# Patient Record
Sex: FEMALE | Race: WHITE | Employment: FULL TIME | URBAN - METROPOLITAN AREA
[De-identification: names, ages, dates, MRNs, and addresses within clinical notes are randomized per-mention and may not be internally consistent; named-entity substitution may affect disease eponyms.]

---

## 2019-02-07 ENCOUNTER — CLINICAL SUPPORT (OUTPATIENT)
Dept: FAMILY MEDICINE CLINIC | Age: 34
End: 2019-02-07

## 2019-02-07 DIAGNOSIS — E66.9 OBESITY, UNSPECIFIED CLASSIFICATION, UNSPECIFIED OBESITY TYPE, UNSPECIFIED WHETHER SERIOUS COMORBIDITY PRESENT: Primary | ICD-10-CM

## 2019-02-08 DIAGNOSIS — E66.01 CLASS 3 SEVERE OBESITY WITH BODY MASS INDEX (BMI) OF 40.0 TO 44.9 IN ADULT, UNSPECIFIED OBESITY TYPE, UNSPECIFIED WHETHER SERIOUS COMORBIDITY PRESENT (HCC): Primary | ICD-10-CM

## 2019-02-08 NOTE — PROGRESS NOTES
Patient attended a Medically Supervised Weight Loss New Patient Orientation today where we discussed:  - New Direction Very Low Calorie Diet details  - Medical Supervision  - Nutrition education  - Cost of Meal Replacements  - Policies and compliance required for program enrollment.      Patients initial consultation with physician is tentatively scheduled for:  Future Appointments   Date Time Provider Syed Telles   2/19/2019  1:00 PM Sriram Brown NP [address] [facility]

## 2019-02-15 ENCOUNTER — HOSPITAL ENCOUNTER (OUTPATIENT)
Dept: LAB | Age: 34
Discharge: HOME | End: 2019-02-15
Payer: COMMERCIAL

## 2019-02-15 DIAGNOSIS — E66.01 CLASS 3 SEVERE OBESITY WITH BODY MASS INDEX (BMI) OF 40.0 TO 44.9 IN ADULT, UNSPECIFIED OBESITY TYPE, UNSPECIFIED WHETHER SERIOUS COMORBIDITY PRESENT (HCC): ICD-10-CM

## 2019-02-15 LAB
ALBUMIN SERPL-MCNC: 4 G/DL (ref 3.4–5)
ALBUMIN/GLOB SERPL: 1.2 {RATIO} (ref 0.8–1.7)
ALP SERPL-CCNC: 66 U/L (ref 45–117)
ALT SERPL-CCNC: 52 U/L (ref 13–56)
ANION GAP SERPL CALC-SCNC: 8 MMOL/L (ref 3–18)
APPEARANCE UR: CLEAR
AST SERPL-CCNC: 34 U/L (ref 15–37)
ATRIAL RATE: 80 BPM
BACTERIA URNS QL MICRO: ABNORMAL /HPF
BASOPHILS # BLD: 0 K/UL (ref 0–0.1)
BASOPHILS NFR BLD: 0 % (ref 0–2)
BILIRUB SERPL-MCNC: 0.4 MG/DL (ref 0.2–1)
BILIRUB UR QL: NEGATIVE
BUN SERPL-MCNC: 10 MG/DL (ref 7–18)
BUN/CREAT SERPL: 11 (ref 12–20)
CALCIUM SERPL-MCNC: 8.8 MG/DL (ref 8.5–10.1)
CALCULATED P AXIS, ECG09: 39 DEGREES
CALCULATED R AXIS, ECG10: 5 DEGREES
CALCULATED T AXIS, ECG11: 27 DEGREES
CHLORIDE SERPL-SCNC: 99 MMOL/L (ref 100–108)
CHOLEST SERPL-MCNC: 174 MG/DL
CO2 SERPL-SCNC: 30 MMOL/L (ref 21–32)
COLOR UR: YELLOW
CREAT SERPL-MCNC: 0.89 MG/DL (ref 0.6–1.3)
DIAGNOSIS, 93000: NORMAL
DIFFERENTIAL METHOD BLD: NORMAL
EOSINOPHIL # BLD: 0.2 K/UL (ref 0–0.4)
EOSINOPHIL NFR BLD: 2 % (ref 0–5)
EPITH CASTS URNS QL MICRO: ABNORMAL /LPF (ref 0–5)
ERYTHROCYTE [DISTWIDTH] IN BLOOD BY AUTOMATED COUNT: 13.5 % (ref 11.6–14.5)
GLOBULIN SER CALC-MCNC: 3.3 G/DL (ref 2–4)
GLUCOSE SERPL-MCNC: 75 MG/DL (ref 74–99)
GLUCOSE UR STRIP.AUTO-MCNC: NEGATIVE MG/DL
HCT VFR BLD AUTO: 40.1 % (ref 35–45)
HDLC SERPL-MCNC: 40 MG/DL (ref 40–60)
HDLC SERPL: 4.4 {RATIO} (ref 0–5)
HGB BLD-MCNC: 14 G/DL (ref 12–16)
HGB UR QL STRIP: NEGATIVE
KETONES UR QL STRIP.AUTO: NEGATIVE MG/DL
LDLC SERPL CALC-MCNC: 88.8 MG/DL (ref 0–100)
LEUKOCYTE ESTERASE UR QL STRIP.AUTO: ABNORMAL
LIPID PROFILE,FLP: ABNORMAL
LYMPHOCYTES # BLD: 3.3 K/UL (ref 0.9–3.6)
LYMPHOCYTES NFR BLD: 32 % (ref 21–52)
MAGNESIUM SERPL-MCNC: 2.2 MG/DL (ref 1.6–2.6)
MCH RBC QN AUTO: 29.7 PG (ref 24–34)
MCHC RBC AUTO-ENTMCNC: 34.9 G/DL (ref 31–37)
MCV RBC AUTO: 85 FL (ref 74–97)
MONOCYTES # BLD: 0.5 K/UL (ref 0.05–1.2)
MONOCYTES NFR BLD: 5 % (ref 3–10)
NEUTS SEG # BLD: 6.1 K/UL (ref 1.8–8)
NEUTS SEG NFR BLD: 61 % (ref 40–73)
NITRITE UR QL STRIP.AUTO: NEGATIVE
P-R INTERVAL, ECG05: 162 MS
PH UR STRIP: 6.5 [PH] (ref 5–8)
PLATELET # BLD AUTO: 369 K/UL (ref 135–420)
PMV BLD AUTO: 9.5 FL (ref 9.2–11.8)
POTASSIUM SERPL-SCNC: 3.7 MMOL/L (ref 3.5–5.5)
PROT SERPL-MCNC: 7.3 G/DL (ref 6.4–8.2)
PROT UR STRIP-MCNC: NEGATIVE MG/DL
Q-T INTERVAL, ECG07: 388 MS
QRS DURATION, ECG06: 98 MS
QTC CALCULATION (BEZET), ECG08: 447 MS
RBC # BLD AUTO: 4.72 M/UL (ref 4.2–5.3)
RBC #/AREA URNS HPF: ABNORMAL /HPF (ref 0–5)
SODIUM SERPL-SCNC: 137 MMOL/L (ref 136–145)
SP GR UR REFRACTOMETRY: 1.01 (ref 1–1.03)
TRIGL SERPL-MCNC: 226 MG/DL (ref ?–150)
TSH SERPL DL<=0.05 MIU/L-ACNC: 3.77 UIU/ML (ref 0.36–3.74)
URATE SERPL-MCNC: 6.4 MG/DL (ref 2.6–7.2)
UROBILINOGEN UR QL STRIP.AUTO: 0.2 EU/DL (ref 0.2–1)
VENTRICULAR RATE, ECG03: 80 BPM
VLDLC SERPL CALC-MCNC: 45.2 MG/DL
WBC # BLD AUTO: 10.1 K/UL (ref 4.6–13.2)
WBC URNS QL MICRO: ABNORMAL /HPF (ref 0–4)

## 2019-02-15 PROCEDURE — 93005 ELECTROCARDIOGRAM TRACING: CPT

## 2019-02-15 PROCEDURE — 36415 COLL VENOUS BLD VENIPUNCTURE: CPT

## 2019-02-15 PROCEDURE — 85025 COMPLETE CBC W/AUTO DIFF WBC: CPT

## 2019-02-15 PROCEDURE — 84443 ASSAY THYROID STIM HORMONE: CPT

## 2019-02-15 PROCEDURE — 81001 URINALYSIS AUTO W/SCOPE: CPT

## 2019-02-15 PROCEDURE — 83735 ASSAY OF MAGNESIUM: CPT

## 2019-02-15 PROCEDURE — 80053 COMPREHEN METABOLIC PANEL: CPT

## 2019-02-15 PROCEDURE — 80061 LIPID PANEL: CPT

## 2019-02-15 PROCEDURE — 84550 ASSAY OF BLOOD/URIC ACID: CPT

## 2019-02-19 ENCOUNTER — OFFICE VISIT (OUTPATIENT)
Dept: SURGERY | Age: 34
End: 2019-02-19

## 2019-02-19 VITALS
HEART RATE: 94 BPM | WEIGHT: 283.7 LBS | RESPIRATION RATE: 18 BRPM | SYSTOLIC BLOOD PRESSURE: 126 MMHG | OXYGEN SATURATION: 98 % | TEMPERATURE: 98.3 F | BODY MASS INDEX: 45.59 KG/M2 | HEIGHT: 66 IN | DIASTOLIC BLOOD PRESSURE: 80 MMHG

## 2019-02-19 DIAGNOSIS — R63.5 WEIGHT GAIN: ICD-10-CM

## 2019-02-19 DIAGNOSIS — E78.5 HYPERLIPIDEMIA, UNSPECIFIED HYPERLIPIDEMIA TYPE: ICD-10-CM

## 2019-02-19 DIAGNOSIS — F41.9 ANXIETY: ICD-10-CM

## 2019-02-19 DIAGNOSIS — Z71.3 WEIGHT LOSS COUNSELING, ENCOUNTER FOR: ICD-10-CM

## 2019-02-19 DIAGNOSIS — E66.01 OBESITY, MORBID (HCC): Primary | ICD-10-CM

## 2019-02-19 DIAGNOSIS — I10 HYPERTENSION, UNSPECIFIED TYPE: ICD-10-CM

## 2019-02-19 RX ORDER — ESCITALOPRAM OXALATE 20 MG/1
20 TABLET ORAL DAILY
COMMUNITY

## 2019-02-19 RX ORDER — ERGOCALCIFEROL 1.25 MG/1
50000 CAPSULE ORAL
COMMUNITY

## 2019-02-19 RX ORDER — HYDROCHLOROTHIAZIDE 25 MG/1
12.5 TABLET ORAL DAILY
COMMUNITY

## 2019-02-19 RX ORDER — FENOFIBRATE 145 MG/1
TABLET, COATED ORAL DAILY
COMMUNITY

## 2019-02-19 NOTE — PROGRESS NOTES
Initial Consultation for Weight Loss    Zander Yip is a 29 y.o. female who comes into the office today for initial consultation for the options for the treatment of obesity. The patient initially identified obesity at the age of 16-14 and at age 26 weighed 200lbs. She has tried a variety of unsupervised weight-loss attempts including self imposed and Weight Watchers, but has yet to meet with lasting success. Maximum weight lost on a diet is about 50 lbs, but that the weight loss always seems to return. Today, the patient is  Height: 5' 6\" (167.6 cm) tall, Weight: 128.7 kg (283 lb 11.2 oz) lbs for a Body mass index is 45.79 kg/m². It is due to the patient's obesity, which is further complicated by hypertension and hyperlipidemia  that the patient is now seeking out medically supervised VLCD. Ideal body weight: 59.3 kg (130 lb 11.7 oz)  Adjusted ideal body weight: 87.1 kg (191 lb 14.7 oz) (Based on [employer] Tables)    Goal wt: 200lbs   End goal: 160- 170lb     Wt Readings from Last 10 Encounters:   02/19/19 128.7 kg (283 lb 11.2 oz)   03/12/18 113 kg (249 lb 1.9 oz)   06/14/13 116.1 kg (256 lb)   06/11/13 112.5 kg (248 lb)   05/28/13 112.5 kg (248 lb)       Weight Metrics 2/19/2019 2/19/2019 3/12/2018 6/14/2013 6/11/2013 5/28/2013   Weight - 283 lb 11.2 oz 249 lb 1.9 oz 256 lb 248 lb 248 lb   Waist Measure Inches 50.75 - - - - -   Body Fat % 46.2 - - - - -   BMI - 45.79 kg/m2 40.21 kg/m2 41.34 kg/m2 40.05 kg/m2 40.05 kg/m2     History of binge eating: no     History of purging: no    Major lifestyle changes: no   Other commitments: no   Any potential unsupportive: no     Has Mame Paulson ever been told by a physician not to exercise: no    Does Mame Paulson know of any reason they shouldn't exercise: no  If yes, why? Does Mame Buttery have any food allergies or sensitivities: cashews and pistachios       MWL questionnaire reviewed. If female:  No LMP recorded.     Past Medical History:   Diagnosis Date    Headache     migraines    Hyperlipidemia     Hypertension        Past Surgical History:   Procedure Laterality Date    HX  SECTION      c section x 2 and d and c    HX CHOLECYSTECTOMY      HX HERNIA REPAIR      incision[facility]       Current Outpatient Medications   Medication Sig Dispense Refill    fenofibrate nanocrystallized (TRICOR) 145 mg tablet Take  by mouth daily.  hydroCHLOROthiazide (HYDRODIURIL) 25 mg tablet Take 25 mg by mouth daily.  escitalopram oxalate (LEXAPRO) 20 mg tablet Take 20 mg by mouth daily.  ergocalciferol (VITAMIN D2) 50,000 unit capsule Take 50,000 Units by mouth every Monday and Friday.  EPINEPHrine (ADRENACLICK) 2.65 HT/1.70 mL injection 0.15 mL by IntraMUSCular route once as needed for up to 2 doses. 0.3 mL 0    LEVOTHYROXINE SODIUM (SYNTHROID PO) Take 75 mcg by mouth.       predniSONE (STERAPRED DS) 10 mg dose pack Take as directed on package 21 Tab 0       No Known Allergies    Social History     Tobacco Use    Smoking status: Never Smoker    Smokeless tobacco: Never Used   Substance Use Topics    Alcohol use: Yes     Frequency: Monthly or less     Comment: occasional    Drug use: No       Family History   Problem Relation Age of Onset    Heart defect Sister         pfo       Family Status   Relation Name Status    Sister  (Not Specified)       Review of Systems:   Positive in BOLD  CONST: Fever, weight loss, fatigue or chills  GI: Nausea, vomiting, abdominal pain, change in bowel habits, hematochezia, melena, and GERD   INTEG: Dermatitis, abnormal moles  HEENT: Recent changes in vision, vertigo, epistaxis, dysphagia and hoarseness  CV: Chest pain, palpitations, HTN, edema and varicosities  RESP: Cough, shortness of breath, wheezing, hemoptysis, snoring and reactive airway disease  : Hematuria, dysuria, frequency, urgency, nocturia and stress urinary incontinence   MS: Weakness, joint pain and arthritis  ENDO: Diabetes, thyroid disease, polyuria, polydipsia, polyphagia, poor wound healing, heat intolerance, cold intolerance  LYMPH/HEME: Anemia, bruising and history of blood transfusions  NEURO: Dizziness, headache, fainting, seizures and stroke  PSYCH: Anxiety and depression      Physical Exam    Visit Vitals  /80   Pulse 94   Temp 98.3 °F (36.8 °C)   Resp 18   Ht 5' 6\" (1.676 m)   Wt 128.7 kg (283 lb 11.2 oz)   SpO2 98%   BMI 45.79 kg/m²           Physical Exam   Constitutional: She is oriented to person, place, and time and well-developed, well-nourished, and in no distress. HENT:   Head: Normocephalic. Cardiovascular: Normal rate and normal heart sounds. Pulmonary/Chest: Effort normal and breath sounds normal.   Abdominal: Soft. Bowel sounds are normal. She exhibits no distension. There is no tenderness. Musculoskeletal: Normal range of motion. Neurological: She is alert and oriented to person, place, and time. Skin: Skin is warm and dry. Psychiatric: Affect normal.   Nursing note and vitals reviewed. Lab results reviewed. For significant abnormal values and values requiring intervention, see assessment and plan. TSH rowan. Endo   Trigly likely to improve with nutritional changes   Denies urinary s/s     Assessment/Plan  Yves Ramon is a 29 y.o. female who is suffering from obesity with a BMI of 45.7 who would benefit from weight loss. Diet regimen   # of meal replacements prescribed: 4    If modified LCD-nutritional guidelines:    Monthly Goal   -15    Medical monitoring schedule:   Weekly BP/Weight checks   Monthly provider appointments              Monthly CMP, uric acid checks      I have reviewed/discussed the above normal BMI with the patient. I have recommended the following interventions: dietary management education, guidance, and counseling, encourage exercise, monitor weight and prescribed dietary intake . Aleshia Deutsch Ms. Medford Schaumann has a reminder for a \"due or due soon\" health maintenance.  I have asked that she contact her primary care provider for follow-up on this health maintenance.     Di Gonzalez, FNP-BC

## 2019-02-19 NOTE — PATIENT INSTRUCTIONS
Monthly goal: -15lbs      4 meal replacements daily, no other food. First one within about 1 hour of waking up to get metabolism started. Don't go more than 6 hours between meals. No more than 1 soup a day, this is too much salt. No more than 1 bar a day, this not enough protein. You are allotted 10 carbs extra a day. Recommendations       - Consume your 4 daily meal replacements equally spaced over the day. Dont go more than 6 hours between each meal. Breakfast is especially important!      - Get the support of family and friends. - Snack-proof your home. - Have strategies for social situations, meetings that run over or vacation.       - If you fall off the plan; just start right back. Reflect on those days into examples of what to change or avoid next time. Program Compliance      We do not expect perfection. However, we do ask for your persistence and your willingness to do the work of growing yourself. You will hit plateaus in your weight loss. You will run into situations that test your will power. You will encounter times when you feel frustrated. How your respond to your slip ups and, the adjustments you make to prevent future slip ups will determine you long-term success. If you find yourself temporarily slipping in the program we will gently nudge you forward and encourage you to do the work of identifying and move beyond your stuck areas. However, if you find yourself wavering in the program for a prolonged time (more than 3 months) we will ask that you take a break from the program. At that time you will have 3 options:       1. Consult with one of our weight loss specialists to explore additional weight loss options. 2. Work with a counselor to do some focused work in order to identify and break the patterns that are holding you back. 3. A combination of both these.       Once you, your counselor and/or your weight loss specialist feel that you have moved past those patterns and want to give the program another chance, we will gladly work with you to determine if you're ready to start back in the program.      When returning after a break, if it has been over 3 months you will required to repeat an orientation and, if greater than 6 months, you will be required to repeat an orientation, labs and an EKG. Homework for [employer]        Exercise:   - Daily starting slow, gradually increase your time by 10- 20 % per week     - To prevent injury, take a recovery week every 4 weeks (reduce your exercise time and intensity by 1/2 during this time)     - Your goal is to work up to 150 min a week; hard enough that you can't whistle or sing. It may take 6 months to work up to this. - Call the Health  at [facility] labs for exercise ideas ([phone])          Diet:              Common Side Effects     -Constipation  -Fatigue  -Hunger  -Low sodium  -Hair Loss        1. Constipation: It can be prevented by Drinking at least 8-10 glasses of water a day, fiber, and exercise. If you're prone to constipation:  - Take a Stool Softener daily. Example: Dulcolax or Miralax   - Eat Plenty of Fiber                        Get the New Direction products with fiber added                        Keep your fiber intake to at least 20 grams a day                        Use SUGAR-FREE products: Fiber One, Benefiber or Metamucil     If you get constipated:  1. Start with a Stool Softener (produces a bowel movement in 72 hr)              2.  If you still have constipation after 2 to 3 days, add a Stimulant Laxative to the Stool Softener (results usually in 6-12 hr):  Examples:  Exlax (1 small square) for up to 4 days, Dulcolax stimulant laxative, or Magnesium citrate pill 500 mg start once a day and increase to 3 times a day if needed. 3. If conditions or symptoms persist contact your provider.      2.  Fatigue, most people experience this:  More common during the first 2 weeks, associated with your body adjusting to the Ketogenic diet. If symptoms persist contact your provider. 3.  Hunger:  More common in the first few days often disappears after body adjusts to the Ketogenic diet. 4.  Low blood levels of sodium, less common:   If you develop a headache, feel fatigued, light-headed or dizziness try adding 1/2 cup of bouillon broth every day. If symptoms persist contact your provider. 5.  Hair loss, you may see more than a usual amount of hair in your brush or the shower drain:   This can happen with physical stress (surgery, trauma or calorie restriction) or psychological stress. Although is it very concerning, it is often temporary and will resolve within 3 months. Some people notice a benefit from taking a daily dose of Biotin 2,500 mcg and increasing their Fish Oil intake. If symptoms persist contact your provider. For further information on where carbs hide in our foods:  1. Our Registered Dietitians     2. Www. Atkins. com/tools     3. Read food labels     4. Books       - The Calorie Luba oJ       - The Christmas System Diet for a New You       - Cici Savage's NEW Carb and Calorie 4th Edition (my favorite)     5. Smart phone and Internet-based apps       - DoubleMap        - Carb Manager     Helpful Information on behavior change:   Change Anything by Michael iMllan, Kourtney Ramos, and Cathi Dupont     Mindless Eating by Deborra Lively     Perfectly Yourself by Flory Cruz     Me, Myself and I - 28 Days to Self-Love by Jyoti Chisholm       Long-term Healthy Weight / Body Fat  Different ways to determining your ideal weight:  1. Keep your waist to less than 1/2 of your height   2. Keep your % body fat to under 30% for female and under 20% if male  3.  Keep your BMI around 25      Fun Facts About Carbs     - The Typical American Diet = 450 grams a day     - The Reducing Phase of the VLCD = 40 grams a day     - Target for weight loss maintenance:                        - Eat no more than 30 grams per meal                        - Eat no more than 10 grams per snack (mid-morning and mid-afternoon snack)

## 2019-02-27 ENCOUNTER — CLINICAL SUPPORT (OUTPATIENT)
Dept: FAMILY MEDICINE CLINIC | Age: 34
End: 2019-02-27

## 2019-02-27 DIAGNOSIS — E66.9 OBESITY, UNSPECIFIED CLASSIFICATION, UNSPECIFIED OBESITY TYPE, UNSPECIFIED WHETHER SERIOUS COMORBIDITY PRESENT: Primary | ICD-10-CM

## 2019-02-28 VITALS
DIASTOLIC BLOOD PRESSURE: 88 MMHG | WEIGHT: 274.9 LBS | HEIGHT: 66 IN | SYSTOLIC BLOOD PRESSURE: 136 MMHG | HEART RATE: 96 BPM | BODY MASS INDEX: 44.18 KG/M2

## 2019-02-28 NOTE — PROGRESS NOTES
Progress Note: Weekly Medical Monitoring in the [facility] Weight Loss Program    Is there anything that you or the patient needs to let the supervising provider know about? no    Over the past week, have you experienced any side-effects? no    Jenifer Masterson is a 29 y.o. female who is enrolled in [facility] Weight Loss Program    Jenifer Masterson was prescribed the VLCD / LCD. Visit Vitals  /88   Pulse 96   Ht 5' 6\" (1.676 m)   Wt 124.7 kg (274 lb 14.4 oz)   BMI 44.37 kg/m²     Weight Metrics 2/28/2019 2/27/2019 2/19/2019 2/19/2019 3/12/2018 6/14/2013 6/11/2013   Weight - 274 lb 14.4 oz - 283 lb 11.2 oz 249 lb 1.9 oz 256 lb 248 lb   Waist Measure Inches 50 - 50.75 - - - -   Body Fat % - - 46.2 - - - -   BMI - 44.37 kg/m2 - 45.79 kg/m2 40.21 kg/m2 41.34 kg/m2 40.05 kg/m2         Have you received any other medical care this week? no  If yes, where and for what? Have you had any change in your medications since your last visit? no  If yes what? Did you have any problems adhering to the program last week? no  If yes, please explain:       Eating Habits Over Last Week:  Did you take in 64 oz of non-caloric fluids? yes     Did you consume your prescribed meal replacement regimen each day?  yes       Physical Activity Over the Past Week:    Aerobic exercise:210 min  Resistance exercise: 0 workouts / week

## 2019-03-08 ENCOUNTER — CLINICAL SUPPORT (OUTPATIENT)
Dept: FAMILY MEDICINE CLINIC | Age: 34
End: 2019-03-08

## 2019-03-08 DIAGNOSIS — E66.9 OBESITY, UNSPECIFIED CLASSIFICATION, UNSPECIFIED OBESITY TYPE, UNSPECIFIED WHETHER SERIOUS COMORBIDITY PRESENT: Primary | ICD-10-CM

## 2019-03-11 VITALS
DIASTOLIC BLOOD PRESSURE: 84 MMHG | WEIGHT: 265.8 LBS | SYSTOLIC BLOOD PRESSURE: 124 MMHG | BODY MASS INDEX: 42.9 KG/M2 | HEART RATE: 82 BPM

## 2019-03-11 NOTE — PROGRESS NOTES
Progress Note: Weekly Medical Monitoring in the [facility] Weight Loss Program    Is there anything that you or the patient needs to let the supervising provider know about? no    Over the past week, have you experienced any side-effects? no    Nadja Couch is a 29 y.o. female who is enrolled in [facility] Weight Loss Program    Nadja Couch was prescribed the VLCD / LCD. Visit Vitals  /84   Pulse 82   Wt 120.6 kg (265 lb 12.8 oz)   BMI 42.90 kg/m²     Weight Metrics 3/11/2019 3/8/2019 2/28/2019 2/27/2019 2/19/2019 2/19/2019 3/12/2018   Weight - 265 lb 12.8 oz - 274 lb 14.4 oz - 283 lb 11.2 oz 249 lb 1.9 oz   Waist Measure Inches 49.5 - 50 - 50.75 - -   Body Fat % - - - - 46.2 - -   BMI - 42.9 kg/m2 - 44.37 kg/m2 - 45.79 kg/m2 40.21 kg/m2         Have you received any other medical care this week? no  If yes, where and for what? Have you had any change in your medications since your last visit? no  If yes what? Did you have any problems adhering to the program last week? no  If yes, please explain:       Eating Habits Over Last Week:  Did you take in 64 oz of non-caloric fluids? yes     Did you consume your prescribed meal replacement regimen each day?  yes       Physical Activity Over the Past Week:    Aerobic exercise: 210 min  Resistance exercise: 0 workouts / week

## 2019-03-15 ENCOUNTER — CLINICAL SUPPORT (OUTPATIENT)
Dept: FAMILY MEDICINE CLINIC | Age: 34
End: 2019-03-15

## 2019-03-15 VITALS
SYSTOLIC BLOOD PRESSURE: 112 MMHG | WEIGHT: 263.1 LBS | HEART RATE: 92 BPM | HEIGHT: 66 IN | BODY MASS INDEX: 42.28 KG/M2 | DIASTOLIC BLOOD PRESSURE: 78 MMHG

## 2019-03-15 DIAGNOSIS — E66.9 OBESITY, UNSPECIFIED CLASSIFICATION, UNSPECIFIED OBESITY TYPE, UNSPECIFIED WHETHER SERIOUS COMORBIDITY PRESENT: Primary | ICD-10-CM

## 2019-03-15 NOTE — PROGRESS NOTES
Chief Complaint   Patient presents with    Weight Management     Progress Note: Weekly Medical Monitoring in the [facility] Weight Loss Program    Is there anything that you or the patient needs to let the supervising provider know about? no    Over the past week, have you experienced any side-effects? no    Urban Harvey is a 29 y.o. female who is enrolled in [facility] Weight Loss Program    Urban Harvey was prescribed the VLCD / LCD. Visit Vitals  /78   Pulse 92   Ht 5' 6\" (1.676 m)   Wt 263 lb 1.6 oz (119.3 kg)   BMI 42.47 kg/m²     Weight Metrics 3/15/2019 3/11/2019 3/8/2019 2/28/2019 2/27/2019 2/19/2019 2/19/2019   Weight 263 lb 1.6 oz - 265 lb 12.8 oz - 274 lb 14.4 oz - 283 lb 11.2 oz   Waist Measure Inches 47 49.5 - 50 - 50.75 -   Body Fat % - - - - - 46.2 -   BMI 42.47 kg/m2 - 42.9 kg/m2 - 44.37 kg/m2 - 45.79 kg/m2         Have you received any other medical care this week? no  If yes, where and for what? Have you had any change in your medications since your last visit? no  If yes what? Did you have any problems adhering to the program last week? no  If yes, please explain:       Eating Habits Over Last Week:  Did you take in 64 oz of non-caloric fluids? yes     Did you consume your prescribed meal replacement regimen each day?  yes       Physical Activity Over the Past Week:    Aerobic exercise: 210 min  Resistance exercise: no workouts / week

## 2019-03-19 ENCOUNTER — OFFICE VISIT (OUTPATIENT)
Dept: SURGERY | Age: 34
End: 2019-03-19

## 2019-03-19 ENCOUNTER — HOSPITAL ENCOUNTER (OUTPATIENT)
Dept: LAB | Age: 34
Discharge: HOME | End: 2019-03-19
Payer: COMMERCIAL

## 2019-03-19 VITALS
HEART RATE: 79 BPM | OXYGEN SATURATION: 98 % | SYSTOLIC BLOOD PRESSURE: 105 MMHG | HEIGHT: 66 IN | WEIGHT: 262 LBS | TEMPERATURE: 98.1 F | BODY MASS INDEX: 42.11 KG/M2 | DIASTOLIC BLOOD PRESSURE: 71 MMHG | RESPIRATION RATE: 16 BRPM

## 2019-03-19 DIAGNOSIS — E66.01 OBESITY, MORBID (HCC): ICD-10-CM

## 2019-03-19 DIAGNOSIS — R63.4 RAPID WEIGHT LOSS: ICD-10-CM

## 2019-03-19 DIAGNOSIS — Z71.3 WEIGHT LOSS COUNSELING, ENCOUNTER FOR: ICD-10-CM

## 2019-03-19 DIAGNOSIS — E66.01 OBESITY, MORBID (HCC): Primary | ICD-10-CM

## 2019-03-19 DIAGNOSIS — I10 HYPERTENSION, UNSPECIFIED TYPE: ICD-10-CM

## 2019-03-19 LAB
ALBUMIN SERPL-MCNC: 4.2 G/DL (ref 3.4–5)
ALBUMIN/GLOB SERPL: 1.4 {RATIO} (ref 0.8–1.7)
ALP SERPL-CCNC: 56 U/L (ref 45–117)
ALT SERPL-CCNC: 57 U/L (ref 13–56)
ANION GAP SERPL CALC-SCNC: 7 MMOL/L (ref 3–18)
AST SERPL-CCNC: 32 U/L (ref 15–37)
BILIRUB SERPL-MCNC: 0.4 MG/DL (ref 0.2–1)
BUN SERPL-MCNC: 18 MG/DL (ref 7–18)
BUN/CREAT SERPL: 18 (ref 12–20)
CALCIUM SERPL-MCNC: 9.3 MG/DL (ref 8.5–10.1)
CHLORIDE SERPL-SCNC: 102 MMOL/L (ref 100–108)
CO2 SERPL-SCNC: 29 MMOL/L (ref 21–32)
CREAT SERPL-MCNC: 1.01 MG/DL (ref 0.6–1.3)
GLOBULIN SER CALC-MCNC: 3 G/DL (ref 2–4)
GLUCOSE SERPL-MCNC: 97 MG/DL (ref 74–99)
POTASSIUM SERPL-SCNC: 3.9 MMOL/L (ref 3.5–5.5)
PROT SERPL-MCNC: 7.2 G/DL (ref 6.4–8.2)
SODIUM SERPL-SCNC: 138 MMOL/L (ref 136–145)
URATE SERPL-MCNC: 5.6 MG/DL (ref 2.6–7.2)

## 2019-03-19 PROCEDURE — 80053 COMPREHEN METABOLIC PANEL: CPT

## 2019-03-19 PROCEDURE — 36415 COLL VENOUS BLD VENIPUNCTURE: CPT

## 2019-03-19 PROCEDURE — 84550 ASSAY OF BLOOD/URIC ACID: CPT

## 2019-03-19 NOTE — PROGRESS NOTES
Chief Complaint   Patient presents with    Weight Management     follow up     Nursing Note for Medical Monitoring in the [facility] Weight Loss Program      Aga Huang is a 29 y.o. female who is enrolled in [facility]    Aga Huang was prescribed the VLCD / LCD. Did you have any problems adhering to the program last week? yes  If yes, please explain: \"forgot to take food with me when I went out\"    Since your last visit, have you experienced any complications? no    Have you received any other medical care this week? no  If yes, where and for what? Have you had any change in your medications since your last visit? no  If yes what? Are you taking an appetite suppressant? no   If yes:  Do you need a refill? BP Readings from Last 3 Encounters:   03/19/19 105/71   03/15/19 112/78   03/11/19 124/84        Eating Habits Over Last Week:  Did you take in 64 oz of non-caloric fluids? yes     Did you consume your prescribed meal replacement regimen each day? yes       Physical Activity Over the Past Week:    Aerobic exercise: 270 min  Resistance exercise: no workouts / week    Body mass index is 42.29 kg/m².

## 2019-03-19 NOTE — PROGRESS NOTES
[facility] Weight Loss Program Progress Note:   F/up Provider Visit    CC: Weight Management    Nuris Velazquez is a 29 y.o. female who is here for her  f/up medical provider visit for the VLCD Program. she did attend class last week. Doing well, no c/o.   -21 lbs      Weight History  Weight Metrics 3/19/2019 3/19/2019 3/15/2019 3/11/2019 3/8/2019 2019 2019   Weight - 262 lb 263 lb 1.6 oz - 265 lb 12.8 oz - 274 lb 14.4 oz   Waist Measure Inches 47.5 - 47 49.5 - 50 -   Body Fat % - - - - - - -   BMI - 42.29 kg/m2 42.47 kg/m2 - 42.9 kg/m2 - 44.37 kg/m2        Goal wt: 200lbs   End goal: 160- 170lbs   Starting wt: 283lbs    EKG due: 233 lbs     Ideal body weight: 59.3 kg (130 lb 11.7 oz)  Adjusted ideal body weight: 83.1 kg (183 lb 3.8 oz)  Body mass index is 42.29 kg/m². History    Past Medical History:   Diagnosis Date    Headache     migraines    Hyperlipidemia     Hypertension        Past Surgical History:   Procedure Laterality Date    HX  SECTION      c section x 2 and d and c    HX CHOLECYSTECTOMY      HX HERNIA REPAIR      incisional from [facility]       Current Outpatient Medications   Medication Sig Dispense Refill    fenofibrate nanocrystallized (TRICOR) 145 mg tablet Take  by mouth daily.  hydroCHLOROthiazide (HYDRODIURIL) 25 mg tablet Take 25 mg by mouth daily.  escitalopram oxalate (LEXAPRO) 20 mg tablet Take 20 mg by mouth daily.  ergocalciferol (VITAMIN D2) 50,000 unit capsule Take 50,000 Units by mouth every Monday and Friday.  EPINEPHrine (ADRENACLICK) 1.88 TS/3.97 mL injection 0.15 mL by IntraMUSCular route once as needed for up to 2 doses. 0.3 mL 0    LEVOTHYROXINE SODIUM (SYNTHROID PO) Take 75 mcg by mouth.          Allergies   Allergen Reactions    Cashew Nut Anaphylaxis    Nut - Unspecified Anaphylaxis     pistachios       Social History     Tobacco Use    Smoking status: Never Smoker    Smokeless tobacco: Never Used   Substance Use Topics  Alcohol use: Yes     Frequency: Monthly or less     Comment: occasional    Drug use: No       Family History   Problem Relation Age of Onset    Heart defect Sister         pfo    Hypertension Mother     Cancer Mother     Cancer Father        Family Status   Relation Name Status    Sister  (Not Specified)    Mother  Alive    Father  Alive         Medications:  Outpatient Medications Marked as Taking for the 3/19/19 encounter (Office Visit) with Joaquina Maloney NP   Medication Sig Dispense Refill    fenofibrate nanocrystallized (TRICOR) 145 mg tablet Take  by mouth daily.  hydroCHLOROthiazide (HYDRODIURIL) 25 mg tablet Take 25 mg by mouth daily.  escitalopram oxalate (LEXAPRO) 20 mg tablet Take 20 mg by mouth daily.  ergocalciferol (VITAMIN D2) 50,000 unit capsule Take 50,000 Units by mouth every Monday and Friday.  EPINEPHrine (ADRENACLICK) 0.61 TI/3.96 mL injection 0.15 mL by IntraMUSCular route once as needed for up to 2 doses. 0.3 mL 0    LEVOTHYROXINE SODIUM (SYNTHROID PO) Take 75 mcg by mouth. Review of Systems  Review of Systems   All other systems reviewed and are negative. Objective  Visit Vitals  /71 (BP 1 Location: Right arm, BP Patient Position: Sitting)   Pulse 79   Temp 98.1 °F (36.7 °C) (Oral)   Resp 16   Ht 5' 6\" (1.676 m)   Wt 118.8 kg (262 lb)   SpO2 98%   BMI 42.29 kg/m²     No LMP recorded. Physical Exam  Physical Exam   Constitutional: She is oriented to person, place, and time. She appears well-developed and well-nourished. HENT:   Head: Normocephalic. Cardiovascular: Normal rate and regular rhythm. Pulmonary/Chest: Effort normal and breath sounds normal.   Abdominal: Soft. Bowel sounds are normal.   Musculoskeletal: Normal range of motion. Neurological: She is alert and oriented to person, place, and time. Skin: Skin is warm and dry. Psychiatric: She has a normal mood and affect.    Nursing note and vitals reviewed. Assessment / Plan    1. Weight management    Degree of control: great!!    Progress was reviewed with patient. Goal(s) for next appointment:   -10 to 15lbs       2.   Labs    Latest results reviewed with patient   Routine monitoring labs ordered  Orders Placed This Encounter    METABOLIC PANEL, COMPREHENSIVE     Standing Status:   Future     Standing Expiration Date:   3/19/2020    URIC ACID     Standing Status:   Future     Standing Expiration Date:   3/19/2020        >50% of 30 min visit spent counseling     JOHAN Baer-BC

## 2019-03-29 ENCOUNTER — CLINICAL SUPPORT (OUTPATIENT)
Dept: FAMILY MEDICINE CLINIC | Age: 34
End: 2019-03-29

## 2019-03-29 VITALS
HEIGHT: 66 IN | HEART RATE: 84 BPM | SYSTOLIC BLOOD PRESSURE: 106 MMHG | BODY MASS INDEX: 41.14 KG/M2 | DIASTOLIC BLOOD PRESSURE: 72 MMHG | WEIGHT: 256 LBS

## 2019-03-29 DIAGNOSIS — E66.9 OBESITY, UNSPECIFIED CLASSIFICATION, UNSPECIFIED OBESITY TYPE, UNSPECIFIED WHETHER SERIOUS COMORBIDITY PRESENT: Primary | ICD-10-CM

## 2019-03-29 NOTE — PROGRESS NOTES
Progress Note: Weekly Medical Monitoring in the [facility] Weight Loss Program    Is there anything that you or the patient needs to let the supervising provider know about? no    Over the past week, have you experienced any side-effects? no    Mena Gilbert is a 29 y.o. female who is enrolled in [facility] Weight Loss Program    Mena Gilbert was prescribed the VLCD / LCD. Visit Vitals  /72   Pulse 84   Ht 5' 6\" (1.676 m)   Wt 116.1 kg (256 lb)   BMI 41.32 kg/m²     Weight Metrics 3/29/2019 3/19/2019 3/19/2019 3/15/2019 3/11/2019 3/8/2019 2/28/2019   Weight 256 lb - 262 lb 263 lb 1.6 oz - 265 lb 12.8 oz -   Waist Measure Inches 46.5 47.5 - 47 49.5 - 50   Body Fat % - - - - - - -   BMI 41.32 kg/m2 - 42.29 kg/m2 42.47 kg/m2 - 42.9 kg/m2 -         Have you received any other medical care this week? yes  If yes, where and for what? pcp  Have you had any change in your medications since your last visit? yes  If yes what? hctz dose cut in half    Did you have any problems adhering to the program last week? no  If yes, please explain:       Eating Habits Over Last Week:  Did you take in 64 oz of non-caloric fluids? yes     Did you consume your prescribed meal replacement regimen each day?  yes       Physical Activity Over the Past Week:    Aerobic exercise: 0 min  Resistance exercise: 0 workouts / week

## 2019-04-05 ENCOUNTER — CLINICAL SUPPORT (OUTPATIENT)
Dept: FAMILY MEDICINE CLINIC | Age: 34
End: 2019-04-05

## 2019-04-05 VITALS
WEIGHT: 253 LBS | DIASTOLIC BLOOD PRESSURE: 78 MMHG | HEIGHT: 66 IN | SYSTOLIC BLOOD PRESSURE: 118 MMHG | BODY MASS INDEX: 40.66 KG/M2 | HEART RATE: 80 BPM

## 2019-04-05 DIAGNOSIS — E66.9 OBESITY, UNSPECIFIED CLASSIFICATION, UNSPECIFIED OBESITY TYPE, UNSPECIFIED WHETHER SERIOUS COMORBIDITY PRESENT: Primary | ICD-10-CM

## 2019-04-05 NOTE — PROGRESS NOTES
Chief Complaint   Patient presents with    Weight Management     Progress Note: Weekly Medical Monitoring in the [facility] Program    Is there anything that you or the patient needs to let the supervising provider know about? no    Over the past week, have you experienced any side-effects? no    Konrad Arauz is a 29 y.o. female who is enrolled in [facility]    Konrad Arauz was prescribed the VLCD / LCD. Visit Vitals  /78   Pulse 80   Ht 5' 6\" (1.676 m)   Wt 253 lb (114.8 kg)   BMI 40.84 kg/m²     Weight Metrics 4/5/2019 3/29/2019 3/19/2019 3/19/2019 3/15/2019 3/11/2019 3/8/2019   Weight 253 lb 256 lb - 262 lb 263 lb 1.6 oz - 265 lb 12.8 oz   Waist Measure Inches - 46.5 47.5 - 47 49.5 -   Body Fat % - - - - - - -   BMI 40.84 kg/m2 41.32 kg/m2 - 42.29 kg/m2 42.47 kg/m2 - 42.9 kg/m2         Have you received any other medical care this week? no  If yes, where and for what? Have you had any change in your medications since your last visit? no  If yes what? Did you have any problems adhering to the program last week? no  If yes, please explain:       Eating Habits Over Last Week:  Did you take in 64 oz of non-caloric fluids? yes     Did you consume your prescribed meal replacement regimen each day?  yes       Physical Activity Over the Past Week:    Aerobic exercise: 210 min  Resistance exercise: no workouts / week

## 2019-04-16 ENCOUNTER — HOSPITAL ENCOUNTER (OUTPATIENT)
Dept: LAB | Age: 34
Discharge: HOME | End: 2019-04-16
Payer: COMMERCIAL

## 2019-04-16 DIAGNOSIS — Z71.3 WEIGHT LOSS COUNSELING, ENCOUNTER FOR: ICD-10-CM

## 2019-04-16 DIAGNOSIS — E78.5 HYPERLIPIDEMIA, UNSPECIFIED HYPERLIPIDEMIA TYPE: ICD-10-CM

## 2019-04-16 DIAGNOSIS — R63.5 WEIGHT GAIN: ICD-10-CM

## 2019-04-16 DIAGNOSIS — E66.01 OBESITY, MORBID (HCC): ICD-10-CM

## 2019-04-16 DIAGNOSIS — I10 HYPERTENSION, UNSPECIFIED TYPE: ICD-10-CM

## 2019-04-16 DIAGNOSIS — F41.9 ANXIETY: ICD-10-CM

## 2019-04-16 LAB
ALBUMIN SERPL-MCNC: 4.1 G/DL (ref 3.4–5)
ALBUMIN/GLOB SERPL: 1.5 {RATIO} (ref 0.8–1.7)
ALP SERPL-CCNC: 49 U/L (ref 45–117)
ALT SERPL-CCNC: 45 U/L (ref 13–56)
ANION GAP SERPL CALC-SCNC: 4 MMOL/L (ref 3–18)
AST SERPL-CCNC: 21 U/L (ref 15–37)
BILIRUB SERPL-MCNC: 0.4 MG/DL (ref 0.2–1)
BUN SERPL-MCNC: 18 MG/DL (ref 7–18)
BUN/CREAT SERPL: 19 (ref 12–20)
CALCIUM SERPL-MCNC: 9.4 MG/DL (ref 8.5–10.1)
CHLORIDE SERPL-SCNC: 103 MMOL/L (ref 100–108)
CO2 SERPL-SCNC: 31 MMOL/L (ref 21–32)
CREAT SERPL-MCNC: 0.96 MG/DL (ref 0.6–1.3)
GLOBULIN SER CALC-MCNC: 2.8 G/DL (ref 2–4)
GLUCOSE SERPL-MCNC: 100 MG/DL (ref 74–99)
POTASSIUM SERPL-SCNC: 3.8 MMOL/L (ref 3.5–5.5)
PROT SERPL-MCNC: 6.9 G/DL (ref 6.4–8.2)
SODIUM SERPL-SCNC: 138 MMOL/L (ref 136–145)
URATE SERPL-MCNC: 4.4 MG/DL (ref 2.6–7.2)

## 2019-04-16 PROCEDURE — 84550 ASSAY OF BLOOD/URIC ACID: CPT

## 2019-04-16 PROCEDURE — 36415 COLL VENOUS BLD VENIPUNCTURE: CPT

## 2019-04-16 PROCEDURE — 80053 COMPREHEN METABOLIC PANEL: CPT

## 2019-04-17 ENCOUNTER — OFFICE VISIT (OUTPATIENT)
Dept: SURGERY | Age: 34
End: 2019-04-17

## 2019-04-17 VITALS
HEART RATE: 74 BPM | BODY MASS INDEX: 40.07 KG/M2 | WEIGHT: 249.3 LBS | TEMPERATURE: 98.2 F | HEIGHT: 66 IN | RESPIRATION RATE: 18 BRPM | SYSTOLIC BLOOD PRESSURE: 118 MMHG | DIASTOLIC BLOOD PRESSURE: 80 MMHG

## 2019-04-17 DIAGNOSIS — Z71.3 WEIGHT LOSS COUNSELING, ENCOUNTER FOR: ICD-10-CM

## 2019-04-17 DIAGNOSIS — R63.4 RAPID WEIGHT LOSS: ICD-10-CM

## 2019-04-17 DIAGNOSIS — E66.01 OBESITY, MORBID (HCC): Primary | ICD-10-CM

## 2019-04-17 DIAGNOSIS — I10 HYPERTENSION, UNSPECIFIED TYPE: ICD-10-CM

## 2019-04-17 NOTE — PROGRESS NOTES
[facility] Progress Note:   F/up Provider Visit    CC: Weight Management    Juan Caputo is a 29 y.o. female who is here for her  f/up medical provider visit for the VLCD Program. she did attend class last week. Doing well, no c/o.   -13 lbs this month   - 34 lbs overall     Notes inc in period freq to twice monthly, has IUD. Weight History  Weight Metrics 2019 2019 2019 3/29/2019 3/19/2019 3/19/2019 3/15/2019   Weight - 249 lb 4.8 oz 253 lb 256 lb - 262 lb 263 lb 1.6 oz   Waist Measure Inches 45.25 - - 46.5 47.5 - 47   Body Fat % - - - - - - -   BMI - 40.24 kg/m2 40.84 kg/m2 41.32 kg/m2 - 42.29 kg/m2 42.47 kg/m2        Goal wt: 200lbs   End goal: 160- 170lbs   Starting wt: 283lbs    EKG due: 233 lbs     Ideal body weight: 59.3 kg (130 lb 11.7 oz)  Adjusted ideal body weight: 80.8 kg (178 lb 2.6 oz)  Body mass index is 40.24 kg/m². History    Past Medical History:   Diagnosis Date    Headache     migraines    Hyperlipidemia     Hypertension        Past Surgical History:   Procedure Laterality Date    HX  SECTION      c section x 2 and d and c    HX CHOLECYSTECTOMY      HX HERNIA REPAIR      incisional from [facility]       Current Outpatient Medications   Medication Sig Dispense Refill    fenofibrate nanocrystallized (TRICOR) 145 mg tablet Take  by mouth daily.  hydroCHLOROthiazide (HYDRODIURIL) 25 mg tablet Take 12.5 mg by mouth daily.  escitalopram oxalate (LEXAPRO) 20 mg tablet Take 20 mg by mouth daily.  ergocalciferol (VITAMIN D2) 50,000 unit capsule Take 50,000 Units by mouth every Monday and Friday.  EPINEPHrine (ADRENACLICK) 9.04 OS/4.91 mL injection 0.15 mL by IntraMUSCular route once as needed for up to 2 doses. 0.3 mL 0    LEVOTHYROXINE SODIUM (SYNTHROID PO) Take 88 mcg by mouth.          Allergies   Allergen Reactions    Cashew Nut Anaphylaxis    Nut - Unspecified Anaphylaxis     pistachios       Social History Tobacco Use    Smoking status: Never Smoker    Smokeless tobacco: Never Used   Substance Use Topics    Alcohol use: Yes     Frequency: Monthly or less     Comment: occasional    Drug use: No       Family History   Problem Relation Age of Onset    Heart defect Sister         pfo    Hypertension Mother     Cancer Mother     Cancer Father        Family Status   Relation Name Status    Sister  (Not Specified)    Mother  Alive    Father  Alive       Medications:  Outpatient Medications Marked as Taking for the 4/17/19 encounter (Office Visit) with Jovan Taylor NP   Medication Sig Dispense Refill    fenofibrate nanocrystallized (TRICOR) 145 mg tablet Take  by mouth daily.  hydroCHLOROthiazide (HYDRODIURIL) 25 mg tablet Take 12.5 mg by mouth daily.  escitalopram oxalate (LEXAPRO) 20 mg tablet Take 20 mg by mouth daily.  ergocalciferol (VITAMIN D2) 50,000 unit capsule Take 50,000 Units by mouth every Monday and Friday.  EPINEPHrine (ADRENACLICK) 7.18 FU/4.21 mL injection 0.15 mL by IntraMUSCular route once as needed for up to 2 doses. 0.3 mL 0    LEVOTHYROXINE SODIUM (SYNTHROID PO) Take 88 mcg by mouth. Review of Systems  Review of Systems   All other systems reviewed and are negative. Objective  Visit Vitals  /80   Pulse 74   Temp 98.2 °F (36.8 °C)   Resp 18   Ht 5' 6\" (1.676 m)   Wt 113.1 kg (249 lb 4.8 oz)   BMI 40.24 kg/m²     No LMP recorded. Physical Exam  Physical Exam   Constitutional: She is oriented to person, place, and time. She appears well-developed and well-nourished. HENT:   Head: Normocephalic. Cardiovascular: Normal rate and regular rhythm. Pulmonary/Chest: Effort normal and breath sounds normal.   Abdominal: Soft. Bowel sounds are normal.   Musculoskeletal: Normal range of motion. Neurological: She is alert and oriented to person, place, and time. Skin: Skin is warm and dry. Psychiatric: She has a normal mood and affect. Nursing note and vitals reviewed. Assessment / Plan    1. Weight management    Degree of control: great!!    Progress was reviewed with patient. Goal(s) for next appointment:   -10 to 15lbs       2.   Labs    Latest results reviewed with patient   Routine monitoring labs ordered       >50% of 20 min visit spent counseling     JOHAN Carrasquillo-BC

## 2019-04-17 NOTE — PROGRESS NOTES
Chief Complaint   Patient presents with    Weight Management     Nursing Note for Medical Monitoring in the [facility]      Lydia Lorenzo is a 29 y.o. female who is enrolled in [facility]    Lydia Lorenzo was prescribed the VLCD / LCD. Did you have any problems adhering to the program last week? no  If yes, please explain:     Since your last visit, have you experienced any complications? no    Have you received any other medical care this week? no  If yes, where and for what? Have you had any change in your medications since your last visit? yes  If yes what? hctz reduced and levothyroxine increased    Are you taking an appetite suppressant? no   If yes:  Do you need a refill? BP Readings from Last 3 Encounters:   04/17/19 118/80   04/05/19 118/78   03/29/19 106/72        Eating Habits Over Last Week:  Did you take in 64 oz of non-caloric fluids? yes     Did you consume your prescribed meal replacement regimen each day?  yes       Physical Activity Over the Past Week:    Aerobic exercise: 210 min  Resistance exercise: 0 workouts / week

## 2019-05-03 ENCOUNTER — CLINICAL SUPPORT (OUTPATIENT)
Dept: FAMILY MEDICINE CLINIC | Age: 34
End: 2019-05-03

## 2019-05-03 VITALS
WEIGHT: 251.8 LBS | BODY MASS INDEX: 40.47 KG/M2 | HEART RATE: 88 BPM | SYSTOLIC BLOOD PRESSURE: 106 MMHG | HEIGHT: 66 IN | DIASTOLIC BLOOD PRESSURE: 74 MMHG

## 2019-05-03 DIAGNOSIS — E66.9 OBESITY, UNSPECIFIED CLASSIFICATION, UNSPECIFIED OBESITY TYPE, UNSPECIFIED WHETHER SERIOUS COMORBIDITY PRESENT: Primary | ICD-10-CM

## 2019-05-03 NOTE — PROGRESS NOTES
Progress Note: Weekly Medical Monitoring in the [facility]    Is there anything that you or the patient needs to let the supervising provider know about? no    Over the past week, have you experienced any side-effects? no    Zully Seaman is a 29 y.o. female who is enrolled in [facility]    Zully Seaman was prescribed the VLCD / LCD. Visit Vitals  /74   Pulse 88   Ht 5' 6\" (1.676 m)   Wt 114.2 kg (251 lb 12.8 oz)   BMI 40.64 kg/m²     Weight Metrics 5/3/2019 4/17/2019 4/17/2019 4/5/2019 3/29/2019 3/19/2019 3/19/2019   Weight 251 lb 12.8 oz - 249 lb 4.8 oz 253 lb 256 lb - 262 lb   Waist Measure Inches 46.25 45.25 - - 46.5 47.5 -   Body Fat % - - - - - - -   BMI 40.64 kg/m2 - 40.24 kg/m2 40.84 kg/m2 41.32 kg/m2 - 42.29 kg/m2         Have you received any other medical care this week? no  If yes, where and for what? Have you had any change in your medications since your last visit? no  If yes what? Did you have any problems adhering to the program last week? yes  If yes, please explain: ate several meals outside of plan      Eating Habits Over Last Week:  Did you take in 64 oz of non-caloric fluids?  yes     Did you consume your prescribed meal replacement regimen each day? no       Physical Activity Over the Past Week:    Aerobic exercise: 0 min  Resistance exercise: 0 workouts / week

## 2019-05-10 ENCOUNTER — CLINICAL SUPPORT (OUTPATIENT)
Dept: FAMILY MEDICINE CLINIC | Age: 34
End: 2019-05-10

## 2019-05-10 VITALS
HEART RATE: 82 BPM | BODY MASS INDEX: 39.6 KG/M2 | DIASTOLIC BLOOD PRESSURE: 80 MMHG | SYSTOLIC BLOOD PRESSURE: 114 MMHG | HEIGHT: 66 IN | WEIGHT: 246.4 LBS

## 2019-05-10 DIAGNOSIS — E66.9 OBESITY, UNSPECIFIED CLASSIFICATION, UNSPECIFIED OBESITY TYPE, UNSPECIFIED WHETHER SERIOUS COMORBIDITY PRESENT: Primary | ICD-10-CM

## 2019-05-10 NOTE — PROGRESS NOTES
Progress Note: Weekly Medical Monitoring in the [facility]    Is there anything that you or the patient needs to let the supervising provider know about? no    Over the past week, have you experienced any side-effects? no    Kaci Cherry is a 29 y.o. female who is enrolled in [facility]    Kaci Cherry was prescribed the VLCD / LCD. Visit Vitals  /80   Pulse 82   Ht 5' 6\" (1.676 m)   Wt 111.8 kg (246 lb 6.4 oz)   BMI 39.77 kg/m²     Weight Metrics 5/10/2019 5/3/2019 4/17/2019 4/17/2019 4/5/2019 3/29/2019 3/19/2019   Weight 246 lb 6.4 oz 251 lb 12.8 oz - 249 lb 4.8 oz 253 lb 256 lb -   Waist Measure Inches 46.25 46.25 45.25 - - 46.5 47.5   Body Fat % - - - - - - -   BMI 39.77 kg/m2 40.64 kg/m2 - 40.24 kg/m2 40.84 kg/m2 41.32 kg/m2 -         Have you received any other medical care this week? no  If yes, where and for what? Have you had any change in your medications since your last visit? no  If yes what? Did you have any problems adhering to the program last week? no  If yes, please explain:       Eating Habits Over Last Week:  Did you take in 64 oz of non-caloric fluids? yes     Did you consume your prescribed meal replacement regimen each day?  yes       Physical Activity Over the Past Week:    Aerobic exercise: 210 min  Resistance exercise: 0 workouts / week

## 2019-05-14 ENCOUNTER — HOSPITAL ENCOUNTER (OUTPATIENT)
Dept: LAB | Age: 34
Discharge: HOME | End: 2019-05-14
Payer: COMMERCIAL

## 2019-05-14 DIAGNOSIS — Z71.3 WEIGHT LOSS COUNSELING, ENCOUNTER FOR: ICD-10-CM

## 2019-05-14 DIAGNOSIS — R63.4 RAPID WEIGHT LOSS: ICD-10-CM

## 2019-05-14 DIAGNOSIS — E66.01 OBESITY, MORBID (HCC): ICD-10-CM

## 2019-05-14 DIAGNOSIS — I10 HYPERTENSION, UNSPECIFIED TYPE: ICD-10-CM

## 2019-05-14 LAB
ALBUMIN SERPL-MCNC: 3.9 G/DL (ref 3.4–5)
ALBUMIN/GLOB SERPL: 1.3 {RATIO} (ref 0.8–1.7)
ALP SERPL-CCNC: 55 U/L (ref 45–117)
ALT SERPL-CCNC: 33 U/L (ref 13–56)
ANION GAP SERPL CALC-SCNC: 5 MMOL/L (ref 3–18)
AST SERPL-CCNC: 21 U/L (ref 15–37)
BILIRUB SERPL-MCNC: 0.3 MG/DL (ref 0.2–1)
BUN SERPL-MCNC: 13 MG/DL (ref 7–18)
BUN/CREAT SERPL: 15 (ref 12–20)
CALCIUM SERPL-MCNC: 9.1 MG/DL (ref 8.5–10.1)
CHLORIDE SERPL-SCNC: 106 MMOL/L (ref 100–108)
CO2 SERPL-SCNC: 28 MMOL/L (ref 21–32)
CREAT SERPL-MCNC: 0.88 MG/DL (ref 0.6–1.3)
GLOBULIN SER CALC-MCNC: 2.9 G/DL (ref 2–4)
GLUCOSE SERPL-MCNC: 92 MG/DL (ref 74–99)
POTASSIUM SERPL-SCNC: 4.3 MMOL/L (ref 3.5–5.5)
PROT SERPL-MCNC: 6.8 G/DL (ref 6.4–8.2)
SODIUM SERPL-SCNC: 139 MMOL/L (ref 136–145)
URATE SERPL-MCNC: 5 MG/DL (ref 2.6–7.2)

## 2019-05-14 PROCEDURE — 80053 COMPREHEN METABOLIC PANEL: CPT

## 2019-05-14 PROCEDURE — 36415 COLL VENOUS BLD VENIPUNCTURE: CPT

## 2019-05-14 PROCEDURE — 84550 ASSAY OF BLOOD/URIC ACID: CPT

## 2019-05-15 ENCOUNTER — OFFICE VISIT (OUTPATIENT)
Dept: SURGERY | Age: 34
End: 2019-05-15

## 2019-05-15 VITALS
DIASTOLIC BLOOD PRESSURE: 74 MMHG | TEMPERATURE: 98.2 F | WEIGHT: 249 LBS | BODY MASS INDEX: 40.02 KG/M2 | SYSTOLIC BLOOD PRESSURE: 108 MMHG | HEART RATE: 80 BPM | HEIGHT: 66 IN | RESPIRATION RATE: 18 BRPM

## 2019-05-15 DIAGNOSIS — E66.01 OBESITY, MORBID (HCC): Primary | ICD-10-CM

## 2019-05-15 DIAGNOSIS — Z71.3 WEIGHT LOSS COUNSELING, ENCOUNTER FOR: ICD-10-CM

## 2019-05-15 NOTE — PROGRESS NOTES
[facility] Progress Note:   F/up Provider Visit    CC: Weight Management    Андрей Radford is a 29 y.o. female who is here for her  f/up medical provider visit for the VLCD Program. she did attend class last week. Struggled with emotional eating this month. No change in weight. More active with kids and feeling good physically. Weight History  Weight Metrics 5/15/2019 5/15/2019 5/10/2019 5/3/2019 2019 2019 2019   Weight - 249 lb 246 lb 6.4 oz 251 lb 12.8 oz - 249 lb 4.8 oz 253 lb   Waist Measure Inches 45 - 46.25 46.25 45.25 - -   Body Fat % - - - - - - -   BMI - 40.19 kg/m2 39.77 kg/m2 40.64 kg/m2 - 40.24 kg/m2 40.84 kg/m2        Goal wt: 200lbs   End goal: 160- 170lbs   Starting wt: 283lbs    EKG due: 233 lbs     Ideal body weight: 59.3 kg (130 lb 11.7 oz)  Adjusted ideal body weight: 80.8 kg (178 lb 0.6 oz)  Body mass index is 40.19 kg/m². History    Past Medical History:   Diagnosis Date    Headache     migraines    Hyperlipidemia     Hypertension        Past Surgical History:   Procedure Laterality Date    HX  SECTION      c section x 2 and d and c    HX CHOLECYSTECTOMY      HX HERNIA REPAIR      incisional from [facility]       Current Outpatient Medications   Medication Sig Dispense Refill    fenofibrate nanocrystallized (TRICOR) 145 mg tablet Take  by mouth daily.  hydroCHLOROthiazide (HYDRODIURIL) 25 mg tablet Take 12.5 mg by mouth daily.  escitalopram oxalate (LEXAPRO) 20 mg tablet Take 20 mg by mouth daily.  ergocalciferol (VITAMIN D2) 50,000 unit capsule Take 50,000 Units by mouth every Monday and Friday.  LEVOTHYROXINE SODIUM (SYNTHROID PO) Take 88 mcg by mouth.  EPINEPHrine (ADRENACLICK) 1.34 KR/0.70 mL injection 0.15 mL by IntraMUSCular route once as needed for up to 2 doses.  0.3 mL 0       Allergies   Allergen Reactions    Cashew Nut Anaphylaxis    Nut - Unspecified Anaphylaxis     pistachios       Social History     Tobacco Use    Smoking status: Never Smoker    Smokeless tobacco: Never Used   Substance Use Topics    Alcohol use: Not Currently     Frequency: Monthly or less     Comment: occasional    Drug use: No       Family History   Problem Relation Age of Onset    Heart defect Sister         pfo    Hypertension Mother     Cancer Mother     Cancer Father        Family Status   Relation Name Status    Sister  (Not Specified)    Mother  Alive    Father  Alive       Medications:  Outpatient Medications Marked as Taking for the 5/15/19 encounter (Office Visit) with Gissel Crandall NP   Medication Sig Dispense Refill    fenofibrate nanocrystallized (TRICOR) 145 mg tablet Take  by mouth daily.  hydroCHLOROthiazide (HYDRODIURIL) 25 mg tablet Take 12.5 mg by mouth daily.  escitalopram oxalate (LEXAPRO) 20 mg tablet Take 20 mg by mouth daily.  ergocalciferol (VITAMIN D2) 50,000 unit capsule Take 50,000 Units by mouth every Monday and Friday.  LEVOTHYROXINE SODIUM (SYNTHROID PO) Take 88 mcg by mouth. Review of Systems  Review of Systems   Psychiatric/Behavioral:        Stress with son's medical condition    All other systems reviewed and are negative. Objective  Visit Vitals  /74   Pulse 80   Temp 98.2 °F (36.8 °C)   Resp 18   Ht 5' 6\" (1.676 m)   Wt 112.9 kg (249 lb)   BMI 40.19 kg/m²     No LMP recorded. Physical Exam  Physical Exam   Constitutional: She is oriented to person, place, and time. She appears well-developed and well-nourished. HENT:   Head: Normocephalic. Cardiovascular: Normal rate and regular rhythm. Pulmonary/Chest: Effort normal and breath sounds normal.   Abdominal: Soft. Bowel sounds are normal.   Musculoskeletal: Normal range of motion. Neurological: She is alert and oriented to person, place, and time. Skin: Skin is warm and dry. Psychiatric: She has a normal mood and affect.    Nursing note and vitals reviewed. Assessment / Plan    1. Weight management    Degree of control: stable, continue with VLCD will inc compliance this month    Progress was reviewed with patient. Goal(s) for next appointment:   -10 to 15lbs       2.   Labs    Latest results reviewed with patient   Routine monitoring labs ordered       >50% of 20 min visit spent counseling     JOHAN Sotelo-BC

## 2019-05-15 NOTE — PATIENT INSTRUCTIONS
Recent Results (from the past 336 hour(s))   METABOLIC PANEL, COMPREHENSIVE    Collection Time: 05/14/19 10:02 AM   Result Value Ref Range    Sodium 139 136 - 145 mmol/L    Potassium 4.3 3.5 - 5.5 mmol/L    Chloride 106 100 - 108 mmol/L    CO2 28 21 - 32 mmol/L    Anion gap 5 3.0 - 18 mmol/L    Glucose 92 74 - 99 mg/dL    BUN 13 7.0 - 18 MG/DL    Creatinine 0.88 0.6 - 1.3 MG/DL    BUN/Creatinine ratio 15 12 - 20      GFR est AA >60 >60 ml/min/1.73m2    GFR est non-AA >60 >60 ml/min/1.73m2    Calcium 9.1 8.5 - 10.1 MG/DL    Bilirubin, total 0.3 0.2 - 1.0 MG/DL    ALT (SGPT) 33 13 - 56 U/L    AST (SGOT) 21 15 - 37 U/L    Alk.  phosphatase 55 45 - 117 U/L    Protein, total 6.8 6.4 - 8.2 g/dL    Albumin 3.9 3.4 - 5.0 g/dL    Globulin 2.9 2.0 - 4.0 g/dL    A-G Ratio 1.3 0.8 - 1.7     URIC ACID    Collection Time: 05/14/19 10:02 AM   Result Value Ref Range    Uric acid 5.0 2.6 - 7.2 MG/DL

## 2019-05-15 NOTE — PROGRESS NOTES
Chief Complaint   Patient presents with    Weight Management   1. Have you been to the ER, urgent care clinic since your last visit? Hospitalized since your last visit? No    2. Have you seen or consulted any other health care providers outside of the [address] [place] [address] since your last visit? Include any pap smears or colon screening. No            Nursing Note for Medical Monitoring in the [facility]      Viola Graham is a 29 y.o. female who is enrolled in [facility]    Viola Graham was prescribed the VLCD / LCD. Did you have any problems adhering to the program last week? yes  If yes, please explain: emotional eating    Since your last visit, have you experienced any complications? no    Have you received any other medical care this week? no  If yes, where and for what? Have you had any change in your medications since your last visit? no  If yes what? Are you taking an appetite suppressant? no   If yes:  Do you need a refill? BP Readings from Last 3 Encounters:   05/15/19 108/74   05/10/19 114/80   05/03/19 106/74        Eating Habits Over Last Week:  Did you take in 64 oz of non-caloric fluids?  yes     Did you consume your prescribed meal replacement regimen each day? no       Physical Activity Over the Past Week:    Aerobic exercise: 210 min  Resistance exercise: 0 workouts / week

## 2019-05-31 ENCOUNTER — CLINICAL SUPPORT (OUTPATIENT)
Dept: FAMILY MEDICINE CLINIC | Age: 34
End: 2019-05-31

## 2019-05-31 VITALS
WEIGHT: 253.2 LBS | DIASTOLIC BLOOD PRESSURE: 77 MMHG | HEART RATE: 74 BPM | SYSTOLIC BLOOD PRESSURE: 113 MMHG | HEIGHT: 66 IN | BODY MASS INDEX: 40.69 KG/M2

## 2019-05-31 DIAGNOSIS — E66.9 OBESITY, UNSPECIFIED CLASSIFICATION, UNSPECIFIED OBESITY TYPE, UNSPECIFIED WHETHER SERIOUS COMORBIDITY PRESENT: Primary | ICD-10-CM

## 2019-05-31 NOTE — PROGRESS NOTES
Chief Complaint   Patient presents with    Weight Management     Patient attended a weekly class as part of the [facility].  This class was facilitated by a Registered Dietitian. Topics taught at class focused on diet, particularly carbohydrate counting and portion control. Classes also focused on behavior changes and the importance of establishing a daily exercise routine. Progress Note: Weekly Medical Monitoring in the [facility]    Is there anything that you or the patient needs to let the supervising provider know about? no    Over the past week, have you experienced any side-effects? no    Mario Alberto Cm is a 29 y.o. female who is enrolled in [facility]    Mario Alberto Cm was prescribed the VLCD / LCD. Visit Vitals  /77   Pulse 74   Ht 5' 6\" (1.676 m)   Wt 253 lb 3.2 oz (114.9 kg)   BMI 40.87 kg/m²     Weight Metrics 5/31/2019 5/15/2019 5/15/2019 5/10/2019 5/3/2019 4/17/2019 4/17/2019   Weight 253 lb 3.2 oz - 249 lb 246 lb 6.4 oz 251 lb 12.8 oz - 249 lb 4.8 oz   Waist Measure Inches 45 45 - 46.25 46.25 45.25 -   Body Fat % - - - - - - -   BMI 40.87 kg/m2 - 40.19 kg/m2 39.77 kg/m2 40.64 kg/m2 - 40.24 kg/m2         Have you received any other medical care this week? no  If yes, where and for what? Have you had any change in your medications since your last visit? no  If yes what? Did you have any problems adhering to the program last week? yes  If yes, please explain: \"traveled for work and fell back into old eating habits\"      Eating Habits Over Last Week:  Did you take in 64 oz of non-caloric fluids?  no     Did you consume your prescribed meal replacement regimen each day? no       Physical Activity Over the Past Week:    Aerobic exercise: 0  min  Resistance exercise: no workouts / week

## 2019-06-14 ENCOUNTER — CLINICAL SUPPORT (OUTPATIENT)
Dept: FAMILY MEDICINE CLINIC | Age: 34
End: 2019-06-14

## 2019-06-14 VITALS
BODY MASS INDEX: 40.42 KG/M2 | HEART RATE: 74 BPM | SYSTOLIC BLOOD PRESSURE: 114 MMHG | WEIGHT: 251.5 LBS | HEIGHT: 66 IN | DIASTOLIC BLOOD PRESSURE: 80 MMHG

## 2019-06-14 DIAGNOSIS — E66.9 OBESITY, UNSPECIFIED CLASSIFICATION, UNSPECIFIED OBESITY TYPE, UNSPECIFIED WHETHER SERIOUS COMORBIDITY PRESENT: Primary | ICD-10-CM

## 2019-06-14 NOTE — PROGRESS NOTES
Patient attended a weekly class as part of the [facility] Supervised Weight Loss Program.  This class was facilitated by a Registered Dietitian. Topics taught at class focused on diet, particularly carbohydrate counting and portion control. Classes also focused on behavior changes and the importance of establishing a daily exercise routine. Progress Note: Weekly Medical Monitoring in the [facility]    Is there anything that you or the patient needs to let the supervising provider know about? no    Over the past week, have you experienced any side-effects? no    Zeb Mendieta is a 29 y.o. female who is enrolled in [facility]    Zeb Mendieta was prescribed the VLCD / LCD. Visit Vitals  /80   Pulse 74   Ht 5' 6\" (1.676 m)   Wt 114.1 kg (251 lb 8 oz)   BMI 40.59 kg/m²     Weight Metrics 6/14/2019 5/31/2019 5/15/2019 5/15/2019 5/10/2019 5/3/2019 4/17/2019   Weight 251 lb 8 oz 253 lb 3.2 oz - 249 lb 246 lb 6.4 oz 251 lb 12.8 oz -   Waist Measure Inches 46.5 45 45 - 46.25 46.25 45.25   Body Fat % - - - - - - -   BMI 40.59 kg/m2 40.87 kg/m2 - 40.19 kg/m2 39.77 kg/m2 40.64 kg/m2 -         Have you received any other medical care this week? no  If yes, where and for what? Have you had any change in your medications since your last visit? no  If yes what? Did you have any problems adhering to the program last week? yes  If yes, please explain: ate outside diet      Eating Habits Over Last Week:  Did you take in 64 oz of non-caloric fluids?  yes    Did you consume your prescribed meal replacement regimen each day? no       Physical Activity Over the Past Week:    Aerobic exercise: 0 min  Resistance exercise: 0 workouts / week

## 2019-06-21 ENCOUNTER — CLINICAL SUPPORT (OUTPATIENT)
Dept: FAMILY MEDICINE CLINIC | Age: 34
End: 2019-06-21

## 2019-06-21 VITALS
HEIGHT: 66 IN | WEIGHT: 255.9 LBS | DIASTOLIC BLOOD PRESSURE: 84 MMHG | SYSTOLIC BLOOD PRESSURE: 126 MMHG | HEART RATE: 82 BPM | BODY MASS INDEX: 41.12 KG/M2

## 2019-06-21 DIAGNOSIS — E66.9 OBESITY, UNSPECIFIED CLASSIFICATION, UNSPECIFIED OBESITY TYPE, UNSPECIFIED WHETHER SERIOUS COMORBIDITY PRESENT: Primary | ICD-10-CM

## 2019-06-21 NOTE — PROGRESS NOTES
Patient attended a weekly class as part of the [facility] Weight Loss Program.  This class was facilitated by a Registered Dietitian. Topics taught at class focused on diet, particularly carbohydrate counting and portion control. Classes also focused on behavior changes and the importance of establishing a daily exercise routine. Progress Note: Weekly Medical Monitoring in the [facility]    Is there anything that you or the patient needs to let the supervising provider know about? no    Over the past week, have you experienced any side-effects? no    Alexandro Aponte is a 29 y.o. female who is enrolled in [facility]    Alexandro Aponte was prescribed the VLCD / LCD. Visit Vitals  /84   Pulse 82   Ht 5' 6\" (1.676 m)   Wt 116.1 kg (255 lb 14.4 oz)   BMI 41.30 kg/m²     Weight Metrics 6/21/2019 6/14/2019 5/31/2019 5/15/2019 5/15/2019 5/10/2019 5/3/2019   Weight 255 lb 14.4 oz 251 lb 8 oz 253 lb 3.2 oz - 249 lb 246 lb 6.4 oz 251 lb 12.8 oz   Waist Measure Inches 46.25 46.5 45 45 - 46.25 46.25   Body Fat % - - - - - - -   BMI 41.3 kg/m2 40.59 kg/m2 40.87 kg/m2 - 40.19 kg/m2 39.77 kg/m2 40.64 kg/m2         Have you received any other medical care this week? no  If yes, where and for what? Have you had any change in your medications since your last visit? no  If yes what? Did you have any problems adhering to the program last week? no  If yes, please explain:       Eating Habits Over Last Week:  Did you take in 64 oz of non-caloric fluids?  yes     Did you consume your prescribed meal replacement regimen each day? no       Physical Activity Over the Past Week:    Aerobic exercise: 0 min  Resistance exercise: 0 workouts / week

## 2019-07-12 ENCOUNTER — CLINICAL SUPPORT (OUTPATIENT)
Dept: SURGERY | Age: 34
End: 2019-07-12

## 2019-07-12 VITALS
DIASTOLIC BLOOD PRESSURE: 82 MMHG | HEART RATE: 80 BPM | BODY MASS INDEX: 41.59 KG/M2 | SYSTOLIC BLOOD PRESSURE: 120 MMHG | HEIGHT: 66 IN | WEIGHT: 258.8 LBS

## 2019-07-12 DIAGNOSIS — E66.9 OBESITY, UNSPECIFIED CLASSIFICATION, UNSPECIFIED OBESITY TYPE, UNSPECIFIED WHETHER SERIOUS COMORBIDITY PRESENT: Primary | ICD-10-CM

## 2019-07-12 NOTE — PROGRESS NOTES
Patient attended a weekly class as part of the Med[facility] Supervised Weight Loss Program.  This class was facilitated by a Registered Dietitian. Topics taught at class focused on diet, particularly carbohydrate counting and portion control. Classes also focused on behavior changes and the importance of establishing a daily exercise routine. Progress Note: Weekly Medical Monitoring in the [facility]    Is there anything that you or the patient needs to let the supervising provider know about? no    Over the past week, have you experienced any side-effects? no    Kiki Lopez is a 29 y.o. female who is enrolled in [facility]    Kiki Lopez was prescribed the VLCD / LCD. Visit Vitals  /82   Pulse 80   Ht 5' 6\" (1.676 m)   Wt 117.4 kg (258 lb 12.8 oz)   BMI 41.77 kg/m²     Weight Metrics 7/12/2019 6/21/2019 6/21/2019 6/14/2019 5/31/2019 5/15/2019 5/15/2019   Weight 258 lb 12.8 oz - 255 lb 14.4 oz 251 lb 8 oz 253 lb 3.2 oz - 249 lb   Waist Measure Inches 45.75 46.25 - 46.5 45 45 -   Body Fat % - - - - - - -   BMI 41.77 kg/m2 - 41.3 kg/m2 40.59 kg/m2 40.87 kg/m2 - 40.19 kg/m2         Have you received any other medical care this week? no  If yes, where and for what? Have you had any change in your medications since your last visit? no  If yes what? Did you have any problems adhering to the program last week? Yes ate outside the diet        Eating Habits Over Last Week:  Did you take in 64 oz of non-caloric fluids?  no     Did you consume your prescribed meal replacement regimen each day? no       Physical Activity Over the Past Week:    Aerobic exercise: 0 min  Resistance exercise: 0 workouts / week

## 2019-07-15 ENCOUNTER — DOCUMENTATION ONLY (OUTPATIENT)
Dept: SURGERY | Age: 34
End: 2019-07-15

## 2019-07-26 ENCOUNTER — TELEPHONE (OUTPATIENT)
Dept: SURGERY | Age: 34
End: 2019-07-26